# Patient Record
Sex: MALE | Race: BLACK OR AFRICAN AMERICAN
[De-identification: names, ages, dates, MRNs, and addresses within clinical notes are randomized per-mention and may not be internally consistent; named-entity substitution may affect disease eponyms.]

---

## 2022-01-07 ENCOUNTER — HOSPITAL ENCOUNTER (INPATIENT)
Dept: HOSPITAL 35 - ER | Age: 22
LOS: 4 days | Discharge: HOME | DRG: 177 | End: 2022-01-11
Attending: HOSPITALIST | Admitting: HOSPITALIST
Payer: COMMERCIAL

## 2022-01-07 VITALS — DIASTOLIC BLOOD PRESSURE: 104 MMHG | SYSTOLIC BLOOD PRESSURE: 169 MMHG

## 2022-01-07 VITALS — HEIGHT: 72.99 IN | BODY MASS INDEX: 26.54 KG/M2 | WEIGHT: 200.3 LBS

## 2022-01-07 DIAGNOSIS — I10: ICD-10-CM

## 2022-01-07 DIAGNOSIS — D72.829: ICD-10-CM

## 2022-01-07 DIAGNOSIS — Z82.49: ICD-10-CM

## 2022-01-07 DIAGNOSIS — Z83.3: ICD-10-CM

## 2022-01-07 DIAGNOSIS — U07.1: Primary | ICD-10-CM

## 2022-01-07 DIAGNOSIS — J12.82: ICD-10-CM

## 2022-01-07 DIAGNOSIS — R27.0: ICD-10-CM

## 2022-01-07 DIAGNOSIS — J02.9: ICD-10-CM

## 2022-01-07 PROCEDURE — 10779: CPT

## 2022-01-07 PROCEDURE — 10879: CPT

## 2022-01-08 VITALS — SYSTOLIC BLOOD PRESSURE: 156 MMHG | DIASTOLIC BLOOD PRESSURE: 89 MMHG

## 2022-01-08 VITALS — SYSTOLIC BLOOD PRESSURE: 155 MMHG | DIASTOLIC BLOOD PRESSURE: 98 MMHG

## 2022-01-08 VITALS — DIASTOLIC BLOOD PRESSURE: 82 MMHG | SYSTOLIC BLOOD PRESSURE: 154 MMHG

## 2022-01-08 VITALS — SYSTOLIC BLOOD PRESSURE: 160 MMHG | DIASTOLIC BLOOD PRESSURE: 105 MMHG

## 2022-01-08 VITALS — SYSTOLIC BLOOD PRESSURE: 157 MMHG | DIASTOLIC BLOOD PRESSURE: 88 MMHG

## 2022-01-08 LAB
ALBUMIN SERPL-MCNC: 3.1 G/DL (ref 3.4–5)
ALT SERPL-CCNC: 23 U/L (ref 30–65)
ANION GAP SERPL CALC-SCNC: 9 MMOL/L (ref 7–16)
APTT BLD: 25.6 SECONDS (ref 24.5–32.8)
AST SERPL-CCNC: 28 U/L (ref 15–37)
BASOPHILS NFR BLD AUTO: 0 % (ref 0–2)
BILIRUB SERPL-MCNC: 0.4 MG/DL (ref 0.2–1)
BILIRUB UR-MCNC: NEGATIVE MG/DL
BUN SERPL-MCNC: 12 MG/DL (ref 7–18)
CALCIUM SERPL-MCNC: 8.1 MG/DL (ref 8.5–10.1)
CHLORIDE SERPL-SCNC: 102 MMOL/L (ref 98–107)
CO2 SERPL-SCNC: 28 MMOL/L (ref 21–32)
COLOR UR: YELLOW
CREAT SERPL-MCNC: 0.9 MG/DL (ref 0.7–1.3)
EOSINOPHIL NFR BLD: 0 % (ref 0–3)
ERYTHROCYTE [DISTWIDTH] IN BLOOD BY AUTOMATED COUNT: 13.4 % (ref 10.5–14.5)
GLUCOSE SERPL-MCNC: 105 MG/DL (ref 74–106)
GRANULOCYTES NFR BLD MANUAL: 62 % (ref 36–66)
HCT VFR BLD CALC: 40.7 % (ref 42–52)
HGB BLD-MCNC: 13.1 GM/DL (ref 14–18)
INR PPP: 0.99
KETONES UR STRIP-MCNC: NEGATIVE MG/DL
LYMPHOCYTES NFR BLD AUTO: 30 % (ref 24–44)
MCH RBC QN AUTO: 26.7 PG (ref 26–34)
MCHC RBC AUTO-ENTMCNC: 32.3 G/DL (ref 28–37)
MCV RBC: 82.8 FL (ref 80–100)
MONOCYTES NFR BLD: 7 % (ref 1–8)
NEUTROPHILS # BLD: 8.3 THOU/UL (ref 1.4–8.2)
NEUTS BAND NFR BLD: 1 % (ref 0–8)
PLATELET # BLD: 279 THOU/UL (ref 150–400)
POTASSIUM SERPL-SCNC: 3.6 MMOL/L (ref 3.5–5.1)
PROT SERPL-MCNC: 7.1 G/DL (ref 6.4–8.2)
PROTHROMBIN TIME: 10.8 SECONDS (ref 10.5–12.1)
RBC # BLD AUTO: 4.92 MIL/UL (ref 4.5–6)
RBC # UR STRIP: NEGATIVE /UL
RBC MORPH BLD: NORMAL
SODIUM SERPL-SCNC: 139 MMOL/L (ref 136–145)
SP GR UR STRIP: 1.02 (ref 1–1.03)
URINE CLARITY: CLEAR
URINE GLUCOSE-RANDOM*: NEGATIVE
URINE LEUKOCYTES-REFLEX: NEGATIVE
URINE NITRITE-REFLEX: NEGATIVE
URINE PROTEIN (DIPSTICK): NEGATIVE
UROBILINOGEN UR STRIP-ACNC: 0.2 E.U./DL (ref 0.2–1)
WBC # BLD AUTO: 13.2 THOU/UL (ref 4–11)

## 2022-01-08 PROCEDURE — XW033E5 INTRODUCTION OF REMDESIVIR ANTI-INFECTIVE INTO PERIPHERAL VEIN, PERCUTANEOUS APPROACH, NEW TECHNOLOGY GROUP 5: ICD-10-PCS | Performed by: HOSPITALIST

## 2022-01-08 NOTE — NUR
RN ASSUMED PT'S CARE AT 0700-1900PM, PT IS A&OX4, PT'S SOB HAS IMPROVED, PT IS
ON ROOM AIR , PT'S O2SAT AND VS ARE STABLE, PT DENIES DIZZINESS , PT CAN GO TO
BATH ROOM BY HIMSELF, BUT PT STILL HAS COUGHING, RN HAS CALLED DR TO REPORT
LAB, CHEST X-RAY AND CTA  RESULTS, NEW ORDER RECEIVED, RN HAS UPDATED
PT'S INFORMATION TO PT'S MONTHER.

## 2022-01-08 NOTE — NUR
ADMISSION:
PT ARRIVED ON UNIT AT APPROX 0315. PT IS ALERT & ORIENTED X 4, CALM AND
COOPERATIVE. CURRENTLY ON RA AND O2 SATS 95-96% PT HAS NO C/O OF PAIN,
NAUSEA/VOMITTING. COMPLETED ADMISSION ASSESSMENT AND NIH STROKE SCALE AND
PT HAD A SCORE OF ZERO. PT HAS A MRI SCHEDULED FOR TODAY AND ALL THE MRI
SCREENING QUESTIONS WERE ANSWERED AND FAXED OVER TO MRI. PT HAS AN UNSTEADY
GAIT. EDUCATED PT ON USING THE CALL LIGHT. PT IS ABLE TO MAKE NEEDS KNOWN.

## 2022-01-09 VITALS — SYSTOLIC BLOOD PRESSURE: 172 MMHG | DIASTOLIC BLOOD PRESSURE: 99 MMHG

## 2022-01-09 VITALS — SYSTOLIC BLOOD PRESSURE: 147 MMHG | DIASTOLIC BLOOD PRESSURE: 93 MMHG

## 2022-01-09 VITALS — SYSTOLIC BLOOD PRESSURE: 162 MMHG | DIASTOLIC BLOOD PRESSURE: 96 MMHG

## 2022-01-09 VITALS — DIASTOLIC BLOOD PRESSURE: 91 MMHG | SYSTOLIC BLOOD PRESSURE: 158 MMHG

## 2022-01-09 VITALS — SYSTOLIC BLOOD PRESSURE: 154 MMHG | DIASTOLIC BLOOD PRESSURE: 108 MMHG

## 2022-01-09 VITALS — DIASTOLIC BLOOD PRESSURE: 87 MMHG | SYSTOLIC BLOOD PRESSURE: 150 MMHG

## 2022-01-09 LAB
ALBUMIN SERPL-MCNC: 2.7 G/DL (ref 3.4–5)
ALT SERPL-CCNC: 24 U/L (ref 30–65)
ANION GAP SERPL CALC-SCNC: 11 MMOL/L (ref 7–16)
AST SERPL-CCNC: 18 U/L (ref 15–37)
BILIRUB DIRECT SERPL-MCNC: < 0.1 MG/DL
BILIRUB SERPL-MCNC: 0.3 MG/DL (ref 0.2–1)
BUN SERPL-MCNC: 13 MG/DL (ref 7–18)
CALCIUM SERPL-MCNC: 8.2 MG/DL (ref 8.5–10.1)
CHLORIDE SERPL-SCNC: 104 MMOL/L (ref 98–107)
CHOLEST SERPL-MCNC: 137 MG/DL (ref ?–200)
CO2 SERPL-SCNC: 26 MMOL/L (ref 21–32)
CREAT SERPL-MCNC: 0.8 MG/DL (ref 0.7–1.3)
ERYTHROCYTE [DISTWIDTH] IN BLOOD BY AUTOMATED COUNT: 12.9 % (ref 10.5–14.5)
EST. AVERAGE GLUCOSE BLD GHB EST-MCNC: 117 MG/DL
GLUCOSE SERPL-MCNC: 105 MG/DL (ref 74–106)
GLYCOHEMOGLOBIN (HGB A1C): 5.7 % (ref 4.8–5.6)
HCT VFR BLD CALC: 39.7 % (ref 42–52)
HDLC SERPL-MCNC: 35 MG/DL (ref 40–?)
HGB BLD-MCNC: 12.8 GM/DL (ref 14–18)
LDLC SERPL-MCNC: 92 MG/DL (ref ?–100)
MCH RBC QN AUTO: 27 PG (ref 26–34)
MCHC RBC AUTO-ENTMCNC: 32.1 G/DL (ref 28–37)
MCV RBC: 84.1 FL (ref 80–100)
PHOSPHATE SERPL-MCNC: 4.1 MG/DL (ref 2.5–4.9)
PLATELET # BLD: 311 THOU/UL (ref 150–400)
POTASSIUM SERPL-SCNC: 3.6 MMOL/L (ref 3.5–5.1)
PROT SERPL-MCNC: 6.2 G/DL (ref 6.4–8.2)
RBC # BLD AUTO: 4.72 MIL/UL (ref 4.5–6)
SODIUM SERPL-SCNC: 141 MMOL/L (ref 136–145)
TC:HDL: 3.9 RATIO
TRIGL SERPL-MCNC: 54 MG/DL (ref ?–150)
VLDLC SERPL CALC-MCNC: 11 MG/DL (ref ?–40)
WBC # BLD AUTO: 13.9 THOU/UL (ref 4–11)

## 2022-01-09 NOTE — NUR
assumed care of pt at 0700. pt aox4 no acute distress.  NIH 0.  gait stable.
denies headache.  antihypertensives changed by physician due to fluctuating
heart rate - asymptomatic.  showered by self with no difficulty.  calls out
appropriately. watching TV throughout the day.  mother updated on plan of
care.  plans for MRI and echo tomorrow. wcm.

## 2022-01-09 NOTE — NUR
ASSUMED PT CARE AT 1900. PT IS ALERT & ORIENTED X 4. NO C/O OF
NAUSEA/VOMITTING. PT HAD C/O OF MILD HEADACHE. ADMINISTERED TYLENOL PRN AND
NOTED RELIEF. PT SEEN BY  AND BEGAN 1ST DOSE OF REMDESIVIR. CURRENTLY
ON RA AND O2 SATS>95%. VSS AFEBRILE. PT SR/SA ON TELE MONITOR. PT IS ABLE TO
MAKE NEEDS KNOWN. CONTINUE WITH PLAN OF CARE.

## 2022-01-10 VITALS — DIASTOLIC BLOOD PRESSURE: 96 MMHG | SYSTOLIC BLOOD PRESSURE: 146 MMHG

## 2022-01-10 VITALS — DIASTOLIC BLOOD PRESSURE: 83 MMHG | SYSTOLIC BLOOD PRESSURE: 149 MMHG

## 2022-01-10 VITALS — DIASTOLIC BLOOD PRESSURE: 97 MMHG | SYSTOLIC BLOOD PRESSURE: 151 MMHG

## 2022-01-10 VITALS — DIASTOLIC BLOOD PRESSURE: 85 MMHG | SYSTOLIC BLOOD PRESSURE: 150 MMHG

## 2022-01-10 VITALS — SYSTOLIC BLOOD PRESSURE: 143 MMHG | DIASTOLIC BLOOD PRESSURE: 91 MMHG

## 2022-01-10 LAB
BASOPHILS NFR BLD AUTO: 0.3 % (ref 0–2)
EOSINOPHIL NFR BLD: 0.9 % (ref 0–3)
ERYTHROCYTE [DISTWIDTH] IN BLOOD BY AUTOMATED COUNT: 13.1 % (ref 10.5–14.5)
GRANULOCYTES NFR BLD MANUAL: 66.4 % (ref 36–66)
HCT VFR BLD CALC: 39.2 % (ref 42–52)
HGB BLD-MCNC: 12.6 GM/DL (ref 14–18)
LYMPHOCYTES NFR BLD AUTO: 24.7 % (ref 24–44)
MCH RBC QN AUTO: 26.9 PG (ref 26–34)
MCHC RBC AUTO-ENTMCNC: 32.1 G/DL (ref 28–37)
MCV RBC: 83.7 FL (ref 80–100)
MONOCYTES NFR BLD: 7.7 % (ref 1–8)
NEUTROPHILS # BLD: 7.5 THOU/UL (ref 1.4–8.2)
PLATELET # BLD: 317 THOU/UL (ref 150–400)
RBC # BLD AUTO: 4.68 MIL/UL (ref 4.5–6)
WBC # BLD AUTO: 11.3 THOU/UL (ref 4–11)

## 2022-01-10 NOTE — NUR
Patient has been alert and oriented x4 this shift. Patient is on enhanced
precautions. Patient is on room air with saturation at 99%. Patient is CCT/
Tele and has been running SB/ SR this shift. Patient is up adlib to the
bathroom and has been continent this shift. Patient is on a heart healthy diet
and takes his pills whole. Patient has no skin issues to report at this time.
Patient has an IV in his right AC with sodium chloride 0.9% running at 50
mL's/ hr. Patient will continue to be monitored.

## 2022-01-10 NOTE — EKG
[address]  [address]
Phone:  [phone]                    ELECTROCARDIOGRAM REPORT      
_______________________________________________________________________________
 
Name:       MILADY GALVIN               Room #:         353-P       ADM IN  
M.R.#:      [id number]     Account #:      [id number]  
Admission:  22    Attend Phys:    Dean Bertrand MD      
Discharge:              Date of Birth:  [birth year]  
                                                          Report #: [id number]
   [id number]
_______________________________________________________________________________
                         [facility] ED
                                       
Test Date:    2022               Test Time:    02:17:44
Pat Name:     MILADY GALVIN          Department:   
Patient ID:   [id number]            Room:         [facility]
Gender:       M                        Technician:   bret
:          [id number]               Requested By: Rashid Barreto
Order Number: [id number] MD:   Fede Kim
                                 Measurements
Intervals                              Axis          
Rate:         56                       P:            22
LA:           146                      QRS:          49
QRSD:         102                      T:            59
QT:           424                                    
QTc:          410                                    
                           Interpretive Statements
Sinus rhythm
Probable left ventricular hypertrophy
No previous ECG available for comparison
Electronically Signed On 1- 7:36:47 CST by Fede Kim
[email or web address]
 
 
 
 
 
 
 
 
 
 
 
 
 
 
 
 
 
 
 
 
 
 
  <ELECTRONICALLY SIGNED>
   By: Fede Kim MD, [facility]    
  01/10/22     0736
D: 22 0217                           _____________________________________
T: 22                           Fede Kim MD, FACC      /EPI

## 2022-01-10 NOTE — NUR
resting quietly tonight. he is aware of plan for mri today. denies pain. no
changes to his condition or additional complaints of dizziness tonight. taking
adequate po.

## 2022-01-10 NOTE — NUR
INITIAL ASSESSMENT:
SW reviewed chart and spoke with nursing and attending physician. Pt was
admitted from home due to ataxia/COVID. Pt placed in Enhanced Isolation. Pt is
afebrile and on room air. Pt is on Remdesivir. Pt scheduled to have an MRI
today. Pt may discharge home pending results. SW spoke with pt via phone.
Introduced role of SW. Pt is alert/orientated x 4. Pt reports he lives at home
and is currently a student at [employer]. Pt is independent with ADLs. No use of
DME. Pt states his PCP recently retired and he will be starting to see another
physician within the group. Unsure name of provider at this time. Pt states he
will have transportation home and will be able to fill his new prescriptions
upon discharge. No SW needs identified at this time. SW is available to assist
should needs arise.

## 2022-01-11 VITALS — DIASTOLIC BLOOD PRESSURE: 81 MMHG | SYSTOLIC BLOOD PRESSURE: 114 MMHG

## 2022-01-11 VITALS — SYSTOLIC BLOOD PRESSURE: 114 MMHG | DIASTOLIC BLOOD PRESSURE: 81 MMHG

## 2022-01-11 VITALS — DIASTOLIC BLOOD PRESSURE: 72 MMHG | SYSTOLIC BLOOD PRESSURE: 129 MMHG

## 2022-01-11 VITALS — SYSTOLIC BLOOD PRESSURE: 145 MMHG | DIASTOLIC BLOOD PRESSURE: 82 MMHG

## 2022-01-11 LAB
ALBUMIN SERPL-MCNC: 3.1 G/DL (ref 3.4–5)
ALT SERPL-CCNC: 84 U/L (ref 30–65)
ANION GAP SERPL CALC-SCNC: 11 MMOL/L (ref 7–16)
AST SERPL-CCNC: 49 U/L (ref 15–37)
BILIRUB DIRECT SERPL-MCNC: < 0.1 MG/DL
BILIRUB SERPL-MCNC: 0.3 MG/DL (ref 0.2–1)
BUN SERPL-MCNC: 11 MG/DL (ref 7–18)
CALCIUM SERPL-MCNC: 8.8 MG/DL (ref 8.5–10.1)
CHLORIDE SERPL-SCNC: 102 MMOL/L (ref 98–107)
CO2 SERPL-SCNC: 27 MMOL/L (ref 21–32)
CREAT SERPL-MCNC: 0.8 MG/DL (ref 0.7–1.3)
GLUCOSE SERPL-MCNC: 89 MG/DL (ref 74–106)
PHOSPHATE SERPL-MCNC: 5.3 MG/DL (ref 2.5–4.9)
POTASSIUM SERPL-SCNC: 3.6 MMOL/L (ref 3.5–5.1)
PROT SERPL-MCNC: 6.7 G/DL (ref 6.4–8.2)
SODIUM SERPL-SCNC: 140 MMOL/L (ref 136–145)

## 2022-01-11 NOTE — NUR
DISCHARGE NOTE:
SW reviewed chart and spoke with nursing and attending physician. Pt remains
in Enhanced Isolation due to COVID. Pt is afebrile and on room air. Pt had MRI
done yesterday and having an echo today. Pt to follow up with neurology in 2
weeks. Pt is medically stable for discharge home today. SW placed call to pt's
room. No answer. No SW discharge needs identified at this time. SW is
available to assist should needs arise.

## 2022-01-11 NOTE — 2DMMODE
[facility]
[address]
[place]   [address]                   2 D/M-MODE ECHOCARDIOGRAM     
_______________________________________________________________________________
 
Name:       MILADY GALVIN               Room #:         353-P       ADM IN  
M.R.#:      [id number]                       Account #:      [id number]  
Admission:  01/08/22    Attend Phys:    Lorin De La Fuente MD  
Discharge:              Date of Birth:  [birth year]  
                                                          Report #: [id number]
                                                                    [id number]
_______________________________________________________________________________
THIS REPORT FOR:  
 
cc:  FAM - Family physician unknown
     FAM - Family physician unknown
     Fede Kim MD [facility]                                         
                                                                       ~
 
--------------- APPROVED REPORT --------------
 
 
Study performed:  01/11/2022 09:21:29
 
EXAM: Limited 2D, Doppler, and color-flow Echocardiogram 
Patient Location: Bedside   
Room #:  353     Status:  routine
 
       BSA:         2.13
HR: 47 bpm  BP:          129/72 mmHg 
Rhythm: Bradycardia     
 
Other Information 
Study Quality: Adequate
 
Indications
Cardiomegaly, history of childhood murmur.
HTN.
COVID +
 
2D Dimensions
IVSd:  10.65 (7-11mm)  
LVDd:  55.00 mm   
PWd:  11.55 (7-11mm)  
LVDs:  41.85 (25-40mm)  
Left Atrium:  35.67 (27-40mm) 
 
Aortic Valve
AoV Peak Gaudencio.:  1.09 m/s 
AO Peak Gr.:  4.77 mmHg  
 
Mitral Valve
    E/A Ratio:  2.2
    MV Decel. Time:  280.76 ms
MV E Max Gaudencio.:  0.67 m/s 
MV A Gaudencio.:  0.31 m/s  
MV PHT:  81.42 ms  
 
 
 
[facility]
[address]
[place]  [address]
Phone:  [phone]                    2 D/M-MODE ECHOCARDIOGRAM     
_______________________________________________________________________________
 
Name:            MILADY GALVIN               Room #:        353-P       ADM IN
M.R.#:           [id number]          Account #:     [id number]  
Admission:       01/08/22         Attend Phys:   Lorin De La Fuente, 
Discharge:                  Date of Birth: [birth year]  
                         Report #:      [id number]
        [id number]
_______________________________________________________________________________
Pulmonary Valve
PV Peak Gaudencio.:  1.17 m/s PV Peak Gr.:  5.52 mmHg
 
Tricuspid Valve
TR Peak Gaudencio.:  2.20 m/s  RAP Estimate:  5.00 mmHg
TR Peak Gr.:  19.44 mmHg 
    PA Pressure:  24.00 mmHg
 
Left Ventricle
The left ventricle is normal size.  Borderline concentric left 
ventricular hypertrophy. Left ventricular systolic function is low 
normal. LVEF is 50-55%. The left ventricular diastolic function is 
normal.
 
Right Ventricle
The right ventricle is normal size. The right ventricular systolic 
function is normal.
 
Atria
The left atrium size is normal. The right atrium size is 
normal.
 
Aortic Valve
The aortic valve is normal in structure. No aortic regurgitation is 
present. There is no aortic valvular stenosis.
 
Mitral Valve
The mitral valve is normal in structure. Trace mitral 
regurgitation.
 
Tricuspid Valve
The tricuspid valve is normal in structure. Trace to mild tricuspid 
regurgitation. Estimated PAP is 24mmHg.
 
Pulmonic Valve
The pulmonary valve is normal in structure. There is no pulmonic 
valvular regurgitation.
 
Great Vessels
The aortic root is normal in size. IVC is normal in size and 
collapses >50% with inspiration.
 
Pericardium
There is no pericardial effusion.
 
<Conclusion>
 
 
[facility]
[address]
[place]  [address]
Phone:  [phone]                    2 D/M-MODE ECHOCARDIOGRAM     
_______________________________________________________________________________
 
Name:            MILADY GALVIN               Room #:        353-P       [facility] [place]
M.R.#:           [id number]          Account #:     [id number]  
Admission:       01/08/22         Attend Phys:   Lorin De La Fuente, 
Discharge:                  Date of Birth: [birth year]  
                         Report #:      [id number]
        [id number]
_______________________________________________________________________________
Normal left ventricular size with borderline concentric 
hypertrophy
Ejection fraction 55%
Normal right ventricular size/function
Normal atrial size
Normal aortic/mitral valve structure and function
Trace tricuspid valve insufficiency
Pulmonary systolic pressure estimated at 24 mmHg
Normal aortic root size
No pericardial effusion
 
 
 
 
 
 
 
 
 
 
 
 
 
 
 
 
 
 
 
 
 
 
 
 
 
 
 
 
 
 
 
 
 
 
  <ELECTRONICALLY SIGNED>
   By: Fede Kim MD, FACC    
  01/11/22 1031
D: 01/11/22 1031                           _____________________________________
T: 01/11/22 1031                           Fede Kim MD, FACC      /INF

## 2022-01-11 NOTE — NUR
Slept fair during the night. Denies any shortness of breath and has been
tolerating room air well. Denies pain or discomfort. Cont. on enhanced
precaution , afebrile. Making some progress towards care plan goals.